# Patient Record
Sex: FEMALE | Race: WHITE | NOT HISPANIC OR LATINO | URBAN - METROPOLITAN AREA
[De-identification: names, ages, dates, MRNs, and addresses within clinical notes are randomized per-mention and may not be internally consistent; named-entity substitution may affect disease eponyms.]

---

## 2023-11-13 ENCOUNTER — TELEMEDICINE (OUTPATIENT)
Dept: PRIMARY CARE | Facility: CLINIC | Age: 18
End: 2023-11-13
Payer: COMMERCIAL

## 2023-11-13 VITALS — BODY MASS INDEX: 24.22 KG/M2 | WEIGHT: 124 LBS

## 2023-11-13 DIAGNOSIS — R21 RASH: Primary | ICD-10-CM

## 2023-11-13 DIAGNOSIS — U07.1 COVID: ICD-10-CM

## 2023-11-13 PROCEDURE — 99213 OFFICE O/P EST LOW 20 MIN: CPT | Performed by: PHYSICIAN ASSISTANT

## 2023-11-13 RX ORDER — VALACYCLOVIR HYDROCHLORIDE 1 G/1
1000 TABLET, FILM COATED ORAL EVERY 24 HOURS
COMMUNITY
Start: 2022-06-07 | End: 2023-11-13 | Stop reason: WASHOUT

## 2023-11-13 RX ORDER — METHYLPREDNISOLONE 4 MG/1
TABLET ORAL
Qty: 21 TABLET | Refills: 0 | Status: SHIPPED | OUTPATIENT
Start: 2023-11-13 | End: 2023-11-20

## 2023-11-13 ASSESSMENT — ENCOUNTER SYMPTOMS
RHINORRHEA: 0
COUGH: 1
VOMITING: 0
DIARRHEA: 0
SORE THROAT: 1

## 2023-11-13 ASSESSMENT — PAIN SCALES - GENERAL: PAINLEVEL: 7

## 2023-11-16 ENCOUNTER — OFFICE VISIT (OUTPATIENT)
Dept: PRIMARY CARE | Facility: CLINIC | Age: 18
End: 2023-11-16
Payer: COMMERCIAL

## 2023-11-16 VITALS
SYSTOLIC BLOOD PRESSURE: 114 MMHG | TEMPERATURE: 99.8 F | BODY MASS INDEX: 24.41 KG/M2 | OXYGEN SATURATION: 100 % | DIASTOLIC BLOOD PRESSURE: 74 MMHG | WEIGHT: 125 LBS

## 2023-11-16 DIAGNOSIS — N89.8 VAGINAL LESION: ICD-10-CM

## 2023-11-16 PROBLEM — B00.9 HERPES SIMPLEX: Status: ACTIVE | Noted: 2022-06-07

## 2023-11-16 PROCEDURE — 87075 CULTR BACTERIA EXCEPT BLOOD: CPT

## 2023-11-16 PROCEDURE — 87529 HSV DNA AMP PROBE: CPT

## 2023-11-16 PROCEDURE — 1036F TOBACCO NON-USER: CPT | Performed by: PHYSICIAN ASSISTANT

## 2023-11-16 PROCEDURE — 87070 CULTURE OTHR SPECIMN AEROBIC: CPT

## 2023-11-16 PROCEDURE — 99213 OFFICE O/P EST LOW 20 MIN: CPT | Performed by: PHYSICIAN ASSISTANT

## 2023-11-16 ASSESSMENT — PAIN SCALES - GENERAL: PAINLEVEL: 10-WORST PAIN EVER

## 2023-11-16 NOTE — PROGRESS NOTES
Subjective   Patient ID: Ayana Nance is a 18 y.o. female who presents for Vaginal Pain (C/O painful lesions on vaginal area since 11/10.).    Vaginal Pain  The patient's primary symptoms include a genital rash. The patient's pertinent negatives include no pelvic pain. The problem occurs constantly. The problem has been gradually worsening. Associated symptoms include rash. Pertinent negatives include no hematuria.    She states the prednisone helped minimally.Start the Valtrex    Review of Systems   Genitourinary:  Positive for genital sores and vaginal pain. Negative for hematuria and pelvic pain.   Skin:  Positive for rash.       Objective   /74   Temp 37.7 °C (99.8 °F)   Wt 56.7 kg (125 lb)   LMP 10/20/2023 (Approximate)   SpO2 100%   BMI 24.41 kg/m²     Physical Exam  Genitourinary:     Labia:         Right: Rash and tenderness present.         Left: Rash and tenderness present.       Comments: Patient appears to have aphthous ulcer type lesions in the vaginal area.  Culture was obtained.  Neurological:      Mental Status: She is alert.         Assessment/Plan   Diagnoses and all orders for this visit:  Vaginal lesion  -     Tissue/Wound Culture/Smear  -     HSV PCR    Still has some appearance of herpes simplex.  We will see what her culture shows.  Discussed using Aquaphor or Vaseline to help with discomfort.

## 2023-11-17 LAB
HSV1 DNA SKIN QL NAA+PROBE: NOT DETECTED
HSV2 DNA SKIN QL NAA+PROBE: NOT DETECTED

## 2023-11-19 LAB
BACTERIA SPEC CULT: ABNORMAL
GRAM STN SPEC: ABNORMAL
GRAM STN SPEC: ABNORMAL

## 2023-11-19 ASSESSMENT — ENCOUNTER SYMPTOMS: HEMATURIA: 0

## 2023-11-20 DIAGNOSIS — R21 RASH: Primary | ICD-10-CM

## 2023-11-20 RX ORDER — AMOXICILLIN 875 MG/1
875 TABLET, FILM COATED ORAL 2 TIMES DAILY
Qty: 20 TABLET | Refills: 0 | Status: SHIPPED | OUTPATIENT
Start: 2023-11-20 | End: 2023-11-30

## 2023-12-11 ENCOUNTER — OFFICE VISIT (OUTPATIENT)
Dept: PRIMARY CARE | Facility: CLINIC | Age: 18
End: 2023-12-11
Payer: COMMERCIAL

## 2023-12-11 VITALS
BODY MASS INDEX: 24.15 KG/M2 | DIASTOLIC BLOOD PRESSURE: 62 MMHG | SYSTOLIC BLOOD PRESSURE: 130 MMHG | OXYGEN SATURATION: 99 % | HEART RATE: 109 BPM | WEIGHT: 123 LBS | TEMPERATURE: 99.3 F | HEIGHT: 60 IN

## 2023-12-11 DIAGNOSIS — N77.0: ICD-10-CM

## 2023-12-11 DIAGNOSIS — M35.2: ICD-10-CM

## 2023-12-11 DIAGNOSIS — N89.8 VAGINAL LESION: Primary | ICD-10-CM

## 2023-12-11 PROCEDURE — 1036F TOBACCO NON-USER: CPT | Performed by: PHYSICIAN ASSISTANT

## 2023-12-11 PROCEDURE — 99212 OFFICE O/P EST SF 10 MIN: CPT | Performed by: PHYSICIAN ASSISTANT

## 2023-12-11 ASSESSMENT — PAIN SCALES - GENERAL: PAINLEVEL: 0-NO PAIN

## 2023-12-11 ASSESSMENT — PATIENT HEALTH QUESTIONNAIRE - PHQ9
1. LITTLE INTEREST OR PLEASURE IN DOING THINGS: NOT AT ALL
2. FEELING DOWN, DEPRESSED OR HOPELESS: NOT AT ALL
SUM OF ALL RESPONSES TO PHQ9 QUESTIONS 1 AND 2: 0

## 2023-12-11 NOTE — PROGRESS NOTES
Subjective   Patient ID: Ayana Nance is a 18 y.o. female who presents for Follow-up (Vaginal lesion).    Patient is here for follow-up of her vaginal sores.  They have resolved since she finished antibiotics.         Review of Systems   Genitourinary:  Negative for dysuria, frequency, genital sores, hematuria, vaginal bleeding and vaginal discharge.       Objective   /62 (BP Location: Left arm)   Pulse 109   Temp 37.4 °C (99.3 °F) (Temporal)   Ht 1.524 m (5')   Wt 55.8 kg (123 lb)   LMP 11/18/2023 (Approximate)   SpO2 99%   BMI 24.02 kg/m²     Physical Exam  Neurological:      General: No focal deficit present.      Mental Status: She is alert. Mental status is at baseline.   Psychiatric:         Mood and Affect: Mood normal.         Thought Content: Thought content normal.         Judgment: Judgment normal.         Assessment/Plan   Diagnoses and all orders for this visit:  Vaginal lesion  Behcet's vulvo-vaginal ulcer (CMS/HCC)    Briefly discussed Behcet's and how it is associated with strep.  Will monitor for any further outbreaks.

## 2023-12-12 ASSESSMENT — ENCOUNTER SYMPTOMS
HEMATURIA: 0
FREQUENCY: 0
DYSURIA: 0

## 2024-01-23 ENCOUNTER — OFFICE VISIT (OUTPATIENT)
Dept: PRIMARY CARE | Facility: CLINIC | Age: 19
End: 2024-01-23
Payer: COMMERCIAL

## 2024-01-23 VITALS
DIASTOLIC BLOOD PRESSURE: 68 MMHG | OXYGEN SATURATION: 99 % | WEIGHT: 125 LBS | BODY MASS INDEX: 24.41 KG/M2 | RESPIRATION RATE: 14 BRPM | SYSTOLIC BLOOD PRESSURE: 114 MMHG | TEMPERATURE: 99.6 F | HEART RATE: 101 BPM

## 2024-01-23 DIAGNOSIS — N76.0 ACUTE VAGINITIS: ICD-10-CM

## 2024-01-23 DIAGNOSIS — M35.2: Primary | ICD-10-CM

## 2024-01-23 DIAGNOSIS — N77.0: Primary | ICD-10-CM

## 2024-01-23 PROBLEM — F41.9 ANXIETY: Status: ACTIVE | Noted: 2021-08-10

## 2024-01-23 PROCEDURE — 1036F TOBACCO NON-USER: CPT | Performed by: PHYSICIAN ASSISTANT

## 2024-01-23 PROCEDURE — 99213 OFFICE O/P EST LOW 20 MIN: CPT | Performed by: PHYSICIAN ASSISTANT

## 2024-01-23 RX ORDER — METRONIDAZOLE 500 MG/1
500 TABLET ORAL 3 TIMES DAILY
Qty: 21 TABLET | Refills: 0 | Status: SHIPPED | OUTPATIENT
Start: 2024-01-23 | End: 2024-01-30

## 2024-01-23 ASSESSMENT — PAIN SCALES - GENERAL: PAINLEVEL: 0-NO PAIN

## 2024-01-23 ASSESSMENT — ENCOUNTER SYMPTOMS
DEPRESSION: 0
OCCASIONAL FEELINGS OF UNSTEADINESS: 0
LOSS OF SENSATION IN FEET: 0

## 2024-01-23 ASSESSMENT — PATIENT HEALTH QUESTIONNAIRE - PHQ9
SUM OF ALL RESPONSES TO PHQ9 QUESTIONS 1 AND 2: 0
1. LITTLE INTEREST OR PLEASURE IN DOING THINGS: NOT AT ALL
2. FEELING DOWN, DEPRESSED OR HOPELESS: NOT AT ALL

## 2024-01-23 NOTE — PROGRESS NOTES
Subjective   Patient ID: Ayana Nance is a 18 y.o. female who presents for Vaginal bumps (X 1 month) and Rash (Right hand rash x 1 month).    Vaginal Itching  The patient's pertinent negatives include no missed menses, pelvic pain or vaginal discharge. This is a recurrent problem. The problem occurs constantly. The patient is experiencing no pain. The problem affects the right side. Pertinent negatives include no abdominal pain, chills, dysuria, fever, flank pain, hematuria, nausea or rash.      She was concerned that her last episode of vaginal ulcers had left a scar.  She has had some itching and a little bit of discharge.  Review of Systems   Constitutional:  Negative for chills and fever.   Gastrointestinal:  Negative for abdominal pain and nausea.   Genitourinary:  Negative for dysuria, flank pain, hematuria, missed menses, pelvic pain and vaginal discharge.   Skin:  Negative for rash.       Objective   /68   Pulse 101   Temp 37.6 °C (99.6 °F)   Resp 14   Wt 56.7 kg (125 lb)   SpO2 99%   BMI 24.41 kg/m²     Physical Exam  Genitourinary:     Labia:         Right: Rash present. No tenderness, lesion or injury.         Left: Rash present. No tenderness, lesion or injury.       Comments: She has no weight is here and type rash on the labia minora bilaterally.  No actual discharge noted.  Neurological:      Mental Status: She is alert.         Assessment/Plan   Diagnoses and all orders for this visit:  Behcet's vulvo-vaginal ulcer (CMS/HCC)  Acute vaginitis  -     metroNIDAZOLE (Flagyl) 500 mg tablet; Take 1 tablet (500 mg) by mouth 3 times a day for 7 days.    Treat her for bacterial vaginosis.  Discussed safe sex practices as well.  Should follow-up if no better.

## 2024-01-25 ASSESSMENT — ENCOUNTER SYMPTOMS
FLANK PAIN: 0
FEVER: 0
CHILLS: 0
NAUSEA: 0
HEMATURIA: 0
DYSURIA: 0
ABDOMINAL PAIN: 0

## 2024-04-11 PROCEDURE — 88305 TISSUE EXAM BY PATHOLOGIST: CPT | Performed by: PATHOLOGY

## 2024-04-11 PROCEDURE — 88305 TISSUE EXAM BY PATHOLOGIST: CPT

## 2024-04-12 ENCOUNTER — LAB REQUISITION (OUTPATIENT)
Dept: LAB | Facility: HOSPITAL | Age: 19
End: 2024-04-12
Payer: COMMERCIAL

## 2024-04-12 DIAGNOSIS — N89.8 OTHER SPECIFIED NONINFLAMMATORY DISORDERS OF VAGINA: ICD-10-CM

## 2024-04-19 LAB
LABORATORY COMMENT REPORT: NORMAL
PATH REPORT.COMMENTS IMP SPEC: NORMAL
PATH REPORT.FINAL DX SPEC: NORMAL
PATH REPORT.GROSS SPEC: NORMAL
PATH REPORT.TOTAL CANCER: NORMAL

## 2025-03-24 ENCOUNTER — OFFICE VISIT (OUTPATIENT)
Dept: URGENT CARE | Age: 20
End: 2025-03-24
Payer: COMMERCIAL

## 2025-03-24 VITALS
BODY MASS INDEX: 24.54 KG/M2 | OXYGEN SATURATION: 98 % | RESPIRATION RATE: 20 BRPM | SYSTOLIC BLOOD PRESSURE: 103 MMHG | WEIGHT: 125 LBS | HEART RATE: 81 BPM | HEIGHT: 60 IN | DIASTOLIC BLOOD PRESSURE: 48 MMHG | TEMPERATURE: 97.8 F

## 2025-03-24 DIAGNOSIS — N30.01 ACUTE CYSTITIS WITH HEMATURIA: Primary | ICD-10-CM

## 2025-03-24 LAB
POC APPEARANCE, URINE: ABNORMAL
POC BILIRUBIN, URINE: NEGATIVE
POC BLOOD, URINE: ABNORMAL
POC COLOR, URINE: YELLOW
POC GLUCOSE, URINE: NEGATIVE MG/DL
POC KETONES, URINE: NEGATIVE MG/DL
POC LEUKOCYTES, URINE: NEGATIVE
POC NITRITE,URINE: NEGATIVE
POC PH, URINE: 6 PH
POC PROTEIN, URINE: NEGATIVE MG/DL
POC SPECIFIC GRAVITY, URINE: 1.01
POC UROBILINOGEN, URINE: 0.2 EU/DL

## 2025-03-24 PROCEDURE — 3008F BODY MASS INDEX DOCD: CPT | Performed by: SURGERY

## 2025-03-24 PROCEDURE — 81003 URINALYSIS AUTO W/O SCOPE: CPT | Performed by: SURGERY

## 2025-03-24 PROCEDURE — 1036F TOBACCO NON-USER: CPT | Performed by: SURGERY

## 2025-03-24 PROCEDURE — 99214 OFFICE O/P EST MOD 30 MIN: CPT | Performed by: SURGERY

## 2025-03-24 RX ORDER — SULFAMETHOXAZOLE AND TRIMETHOPRIM 800; 160 MG/1; MG/1
1 TABLET ORAL 2 TIMES DAILY
Qty: 10 TABLET | Refills: 0 | Status: SHIPPED | OUTPATIENT
Start: 2025-03-24 | End: 2025-03-29

## 2025-03-24 NOTE — PROGRESS NOTES
Chief Complaint   Patient presents with    Difficulty Urinating     Pt c/o bladder pressure, increased freq, decreased output, taking AZO  x Feb 23, 2025       Physical Exam:     Abdomen is soft, non-tender, and non-distended. Mild suprapubic tenderness. No CVA tenderness.     POC Labs:     Office Visit on 03/24/2025   Component Date Value Ref Range Status    POC Color, Urine 03/24/2025 Yellow  Straw, Yellow, Light-Yellow Final    POC Appearance, Urine 03/24/2025 Turbid (A)  Clear Final    POC Glucose, Urine 03/24/2025 NEGATIVE  NEGATIVE mg/dl Final    POC Bilirubin, Urine 03/24/2025 NEGATIVE  NEGATIVE Final    POC Ketones, Urine 03/24/2025 NEGATIVE  NEGATIVE mg/dl Final    POC Specific Gravity, Urine 03/24/2025 1.015  1.005 - 1.035 Final    POC Blood, Urine 03/24/2025 LARGE (3+) (A)  NEGATIVE Final    POC PH, Urine 03/24/2025 6.0  No Reference Range Established PH Final    POC Protein, Urine 03/24/2025 NEGATIVE  NEGATIVE mg/dl Final    POC Urobilinogen, Urine 03/24/2025 0.2  0.2, 1.0 EU/DL Final    Poc Nitrite, Urine 03/24/2025 NEGATIVE  NEGATIVE Final    POC Leukocytes, Urine 03/24/2025 NEGATIVE  NEGATIVE Final            Encounter Diagnosis   Name Primary?    Acute cystitis with hematuria Yes            Medical Decision Making & Plan:       Bactrim  Ucx    Home      03/24/25 at 9:26 AM - Mishel Nixon, DO

## 2025-03-26 LAB — BACTERIA UR CULT: NORMAL

## 2025-08-29 ENCOUNTER — OFFICE VISIT (OUTPATIENT)
Dept: PRIMARY CARE | Facility: CLINIC | Age: 20
End: 2025-08-29
Payer: COMMERCIAL

## 2025-08-29 VITALS
SYSTOLIC BLOOD PRESSURE: 116 MMHG | HEART RATE: 97 BPM | OXYGEN SATURATION: 98 % | TEMPERATURE: 98.7 F | DIASTOLIC BLOOD PRESSURE: 74 MMHG | WEIGHT: 123 LBS | HEIGHT: 60 IN | BODY MASS INDEX: 24.15 KG/M2

## 2025-08-29 DIAGNOSIS — R35.0 FREQUENCY OF URINATION: ICD-10-CM

## 2025-08-29 DIAGNOSIS — N30.01 ACUTE CYSTITIS WITH HEMATURIA: Primary | ICD-10-CM

## 2025-08-29 PROBLEM — K29.00 ACUTE GASTRITIS: Status: ACTIVE | Noted: 2023-08-25

## 2025-08-29 PROBLEM — H83.09 LABYRINTHITIS: Status: ACTIVE | Noted: 2023-08-25

## 2025-08-29 PROBLEM — R21 RASH: Status: ACTIVE | Noted: 2025-08-29

## 2025-08-29 PROBLEM — N89.8 VAGINAL LESION: Status: ACTIVE | Noted: 2025-08-29

## 2025-08-29 PROBLEM — S89.90XA INJURY OF KNEE: Status: ACTIVE | Noted: 2025-08-29

## 2025-08-29 LAB
POC APPEARANCE, URINE: CLEAR
POC BILIRUBIN, URINE: NEGATIVE
POC BLOOD, URINE: ABNORMAL
POC COLOR, URINE: YELLOW
POC GLUCOSE, URINE: NEGATIVE MG/DL
POC KETONES, URINE: NEGATIVE MG/DL
POC LEUKOCYTES, URINE: ABNORMAL
POC NITRITE,URINE: POSITIVE
POC PH, URINE: 7 PH
POC PROTEIN, URINE: ABNORMAL MG/DL
POC SPECIFIC GRAVITY, URINE: 1.02
POC UROBILINOGEN, URINE: 2 EU/DL

## 2025-08-29 PROCEDURE — 99213 OFFICE O/P EST LOW 20 MIN: CPT

## 2025-08-29 PROCEDURE — 3008F BODY MASS INDEX DOCD: CPT

## 2025-08-29 PROCEDURE — 1036F TOBACCO NON-USER: CPT

## 2025-08-29 PROCEDURE — 81003 URINALYSIS AUTO W/O SCOPE: CPT

## 2025-08-29 RX ORDER — NITROFURANTOIN 25; 75 MG/1; MG/1
100 CAPSULE ORAL 2 TIMES DAILY
Qty: 10 CAPSULE | Refills: 0 | Status: SHIPPED | OUTPATIENT
Start: 2025-08-29 | End: 2025-09-03

## 2025-08-29 RX ORDER — PHENAZOPYRIDINE HYDROCHLORIDE 100 MG/1
100 TABLET, FILM COATED ORAL 3 TIMES DAILY PRN
Qty: 9 TABLET | Refills: 0 | Status: SHIPPED | OUTPATIENT
Start: 2025-08-29 | End: 2025-09-01

## 2025-08-29 ASSESSMENT — ENCOUNTER SYMPTOMS
VOMITING: 0
NAUSEA: 0
HEMATURIA: 0
SWEATS: 0
FLANK PAIN: 0
FREQUENCY: 1
CHILLS: 1
DYSURIA: 1

## 2025-08-29 ASSESSMENT — COLUMBIA-SUICIDE SEVERITY RATING SCALE - C-SSRS
6. HAVE YOU EVER DONE ANYTHING, STARTED TO DO ANYTHING, OR PREPARED TO DO ANYTHING TO END YOUR LIFE?: NO
1. IN THE PAST MONTH, HAVE YOU WISHED YOU WERE DEAD OR WISHED YOU COULD GO TO SLEEP AND NOT WAKE UP?: NO
2. HAVE YOU ACTUALLY HAD ANY THOUGHTS OF KILLING YOURSELF?: NO

## 2025-08-29 ASSESSMENT — PATIENT HEALTH QUESTIONNAIRE - PHQ9
1. LITTLE INTEREST OR PLEASURE IN DOING THINGS: NOT AT ALL
SUM OF ALL RESPONSES TO PHQ9 QUESTIONS 1 AND 2: 0
2. FEELING DOWN, DEPRESSED OR HOPELESS: NOT AT ALL

## 2025-08-31 LAB — BACTERIA UR CULT: ABNORMAL

## 2025-09-01 LAB — BACTERIA UR CULT: ABNORMAL
